# Patient Record
Sex: FEMALE
[De-identification: names, ages, dates, MRNs, and addresses within clinical notes are randomized per-mention and may not be internally consistent; named-entity substitution may affect disease eponyms.]

---

## 2021-11-22 ENCOUNTER — NON-APPOINTMENT (OUTPATIENT)
Age: 31
End: 2021-11-22

## 2021-11-23 ENCOUNTER — APPOINTMENT (OUTPATIENT)
Dept: OBGYN | Facility: CLINIC | Age: 31
End: 2021-11-23
Payer: COMMERCIAL

## 2021-11-23 DIAGNOSIS — Z87.19 PERSONAL HISTORY OF OTHER DISEASES OF THE DIGESTIVE SYSTEM: ICD-10-CM

## 2021-11-23 DIAGNOSIS — Z00.00 ENCOUNTER FOR GENERAL ADULT MEDICAL EXAMINATION W/OUT ABNORMAL FINDINGS: ICD-10-CM

## 2021-11-23 DIAGNOSIS — N39.3 STRESS INCONTINENCE (FEMALE) (MALE): ICD-10-CM

## 2021-11-23 DIAGNOSIS — R73.03 PREDIABETES.: ICD-10-CM

## 2021-11-23 DIAGNOSIS — Z83.49 FAMILY HISTORY OF OTHER ENDOCRINE, NUTRITIONAL AND METABOLIC DISEASES: ICD-10-CM

## 2021-11-23 LAB
HCG UR QL: NEGATIVE
QUALITY CONTROL: YES

## 2021-11-23 PROCEDURE — 81025 URINE PREGNANCY TEST: CPT

## 2021-11-23 PROCEDURE — 99385 PREV VISIT NEW AGE 18-39: CPT

## 2021-11-23 RX ORDER — BACILLUS COAGULANS/INULIN 1B-250 MG
CAPSULE ORAL
Refills: 0 | Status: ACTIVE | COMMUNITY

## 2021-11-23 RX ORDER — MULTIVITAMIN
TABLET ORAL
Refills: 0 | Status: ACTIVE | COMMUNITY

## 2021-11-23 NOTE — DISCUSSION/SUMMARY
[FreeTextEntry1] : 30yo G0 LMP 11/11 using condoms for BCM (declined h'monal bcm today) with ? abnl vag d/c, urinary frequency and BRIAN sxs. \par \par Urinary sxs: \par - UCx sent today \par - Will refer to Dr Anderson for further eval \par \par Ov cyst and pelvic discomfort on exam:\par - TVUS and MD visit \par \par Vag d/c: \par - Pap/GC/CT \par - BD affirm \par - Vulvovag hygeine reviewed \par \par RHM: \par - DVS neg x 3\par - Dentist, PCP, Derm as discussed \par - Offered HPV vacc today. Pt accepts/ Shot # 1 given today \par - Offered STI screen today. Pt accepts \par - Encouraged healthy diet, exercise, weight maint. \par \par Danyelle Centeno MD \par Obstetrician/Gynecologist\par \par

## 2021-11-23 NOTE — REASON FOR VISIT
[Initial] : an initial consultation for [Interpreters_IDNumber] : [id number] [Interpreters_FullName] : Tim  [TWNoteComboBox1] : Indonesian

## 2021-11-23 NOTE — PHYSICAL EXAM
[Appropriately responsive] : appropriately responsive [Alert] : alert [No Acute Distress] : no acute distress [No Lymphadenopathy] : no lymphadenopathy [Regular Rate Rhythm] : regular rate rhythm [No Murmurs] : no murmurs [Clear to Auscultation B/L] : clear to auscultation bilaterally [Soft] : soft [Non-tender] : non-tender [Non-distended] : non-distended [No HSM] : No HSM [No Lesions] : no lesions [No Mass] : no mass [Oriented x3] : oriented x3 [Examination Of The Breasts] : a normal appearance [No Masses] : no breast masses were palpable [Labia Majora] : normal [Labia Minora] : normal [Normal] : normal [Uterine Adnexae] : normal [Adnexa Tenderness On The Right] : tender

## 2021-11-23 NOTE — HISTORY OF PRESENT ILLNESS
[Patient reported breast sonogram was normal] : Patient reported breast sonogram was normal [Patient reported PAP Smear was normal] : Patient reported PAP Smear was normal [FreeTextEntry1] : 32yo G0 LMP 11/11 using condoms for BCM here for annual and with several complaints. She reports vaginal irritation since the summer, frequent burning with urination and yellowish d/c. Pt also has BRIAN issues. s/p COVID vaccine. \par \par Additionally, she says she was told she had cysts when she had [place]  [Mammogramdate] : N/A [BreastSonogramDate] : 2020  [PapSmeardate] : 2020  [TextBox_31] : [facility]  [BoneDensityDate] : N/A [ColonoscopyDate] : N/A

## 2021-11-24 LAB
C TRACH RRNA SPEC QL NAA+PROBE: NOT DETECTED
HIV1+2 AB SPEC QL IA.RAPID: NONREACTIVE
HPV HIGH+LOW RISK DNA PNL CVX: NOT DETECTED
N GONORRHOEA RRNA SPEC QL NAA+PROBE: NOT DETECTED
SOURCE TP AMPLIFICATION: NORMAL

## 2021-11-29 LAB
BACTERIA UR CULT: NORMAL
CANDIDA VAG CYTO: NOT DETECTED
G VAGINALIS+PREV SP MTYP VAG QL MICRO: NOT DETECTED
HBV CORE IGM SER QL: NONREACTIVE
HBV SURFACE AG SER QL: NONREACTIVE
HCV AB SER QL: NONREACTIVE
HCV S/CO RATIO: 0.08 S/CO
HSV 1+2 IGG SER IA-IMP: NEGATIVE
HSV 1+2 IGG SER IA-IMP: POSITIVE
HSV1 IGG SER QL: 31.2 INDEX
HSV2 IGG SER QL: 0.09 INDEX
T PALLIDUM AB SER QL IA: NEGATIVE
T VAGINALIS VAG QL WET PREP: NOT DETECTED

## 2021-11-30 DIAGNOSIS — N83.209 UNSPECIFIED OVARIAN CYST, UNSPECIFIED SIDE: ICD-10-CM

## 2021-12-03 LAB
CYTOLOGY CVX/VAG DOC THIN PREP: NORMAL
HSV1 IGM SER QL: NEGATIVE
HSV2 AB FLD-ACNC: NEGATIVE

## 2021-12-15 ENCOUNTER — ASOB RESULT (OUTPATIENT)
Age: 31
End: 2021-12-15

## 2021-12-15 ENCOUNTER — APPOINTMENT (OUTPATIENT)
Dept: ANTEPARTUM | Facility: CLINIC | Age: 31
End: 2021-12-15
Payer: COMMERCIAL

## 2021-12-15 ENCOUNTER — LABORATORY RESULT (OUTPATIENT)
Age: 31
End: 2021-12-15

## 2021-12-15 PROCEDURE — 76856 US EXAM PELVIC COMPLETE: CPT | Mod: 59

## 2021-12-15 PROCEDURE — 76830 TRANSVAGINAL US NON-OB: CPT

## 2021-12-16 ENCOUNTER — APPOINTMENT (OUTPATIENT)
Dept: OBGYN | Facility: CLINIC | Age: 31
End: 2021-12-16
Payer: COMMERCIAL

## 2021-12-16 DIAGNOSIS — N76.0 ACUTE VAGINITIS: ICD-10-CM

## 2021-12-16 PROCEDURE — 99214 OFFICE O/P EST MOD 30 MIN: CPT

## 2021-12-17 ENCOUNTER — NON-APPOINTMENT (OUTPATIENT)
Age: 31
End: 2021-12-17

## 2021-12-17 LAB — HBV SURFACE AB SER QL: REACTIVE

## 2021-12-20 ENCOUNTER — NON-APPOINTMENT (OUTPATIENT)
Age: 31
End: 2021-12-20

## 2022-02-16 ENCOUNTER — APPOINTMENT (OUTPATIENT)
Dept: OBGYN | Facility: CLINIC | Age: 32
End: 2022-02-16
Payer: COMMERCIAL

## 2022-02-16 VITALS
BODY MASS INDEX: 23.04 KG/M2 | SYSTOLIC BLOOD PRESSURE: 100 MMHG | WEIGHT: 130 LBS | HEIGHT: 63 IN | DIASTOLIC BLOOD PRESSURE: 60 MMHG

## 2022-02-16 DIAGNOSIS — Z32.02 ENCOUNTER FOR PREGNANCY TEST, RESULT NEGATIVE: ICD-10-CM

## 2022-02-16 DIAGNOSIS — Z23 ENCOUNTER FOR IMMUNIZATION: ICD-10-CM

## 2022-02-16 LAB
HCG UR QL: NEGATIVE
QUALITY CONTROL: YES

## 2022-02-16 PROCEDURE — 90471 IMMUNIZATION ADMIN: CPT

## 2022-02-16 PROCEDURE — 90649 4VHPV VACCINE 3 DOSE IM: CPT

## 2022-02-16 RX ORDER — TERCONAZOLE 80 MG/1
80 SUPPOSITORY VAGINAL
Qty: 5 | Refills: 1 | Status: DISCONTINUED | COMMUNITY
Start: 2021-12-16 | End: 2022-02-16

## 2022-02-16 NOTE — PLAN
[FreeTextEntry1] :  offered, pt declines\par Consent obtained\par Gardasil given L delt\par Lot [id number]\par exp 5/28/22\par ndc [id number]\par \par Joann Buckner CM

## 2022-06-14 ENCOUNTER — APPOINTMENT (OUTPATIENT)
Dept: OBGYN | Facility: CLINIC | Age: 32
End: 2022-06-14
Payer: COMMERCIAL

## 2022-06-14 VITALS
DIASTOLIC BLOOD PRESSURE: 60 MMHG | WEIGHT: 128.19 LBS | HEIGHT: 63 IN | BODY MASS INDEX: 22.71 KG/M2 | SYSTOLIC BLOOD PRESSURE: 96 MMHG

## 2022-06-14 DIAGNOSIS — N32.81 OVERACTIVE BLADDER: ICD-10-CM

## 2022-06-14 PROCEDURE — 99213 OFFICE O/P EST LOW 20 MIN: CPT

## 2022-06-14 NOTE — PHYSICAL EXAM
[Chaperone Present] : A chaperone was present in the examining room during all aspects of the physical examination [Appropriately responsive] : appropriately responsive [Alert] : alert [No Acute Distress] : no acute distress [Oriented x3] : oriented x3 [Examination Of The Breasts] : a normal appearance [Normal] : normal [No Masses] : no breast masses were palpable

## 2022-06-14 NOTE — HISTORY OF PRESENT ILLNESS
[N] : Patient denies prior pregnancies [Menarche Age: ____] : age at menarche was [unfilled] [Regular Cycle Intervals] : periods have been regular [Frequency: Q ___ days] : menstrual periods occur approximately every [unfilled] days [PapSmeardate] : 11/21 [PGHxTotal] : 0 [PGHxFullTerm] : 0 [PGHxPremature] : 0 [PGHxAbortions] : 0 [[facility]] : 0 [PGHxABInduced] : 0 [PGHxABSpont] : 0 [PGHxEctopic] : 0 [PGHxMultBirths] : 0

## 2022-06-24 ENCOUNTER — RESULT REVIEW (OUTPATIENT)
Age: 32
End: 2022-06-24

## 2022-06-24 ENCOUNTER — OUTPATIENT (OUTPATIENT)
Dept: OUTPATIENT SERVICES | Facility: HOSPITAL | Age: 32
LOS: 1 days | End: 2022-06-24
Payer: COMMERCIAL

## 2022-06-24 ENCOUNTER — APPOINTMENT (OUTPATIENT)
Dept: MAMMOGRAPHY | Facility: CLINIC | Age: 32
End: 2022-06-24

## 2022-06-24 ENCOUNTER — APPOINTMENT (OUTPATIENT)
Dept: ULTRASOUND IMAGING | Facility: CLINIC | Age: 32
End: 2022-06-24

## 2022-06-24 DIAGNOSIS — N64.4 MASTODYNIA: ICD-10-CM

## 2022-06-24 PROCEDURE — 76641 ULTRASOUND BREAST COMPLETE: CPT

## 2022-06-24 PROCEDURE — 76641 ULTRASOUND BREAST COMPLETE: CPT | Mod: 26,50

## 2022-06-28 ENCOUNTER — APPOINTMENT (OUTPATIENT)
Dept: OBGYN | Facility: CLINIC | Age: 32
End: 2022-06-28
Payer: COMMERCIAL

## 2022-06-28 VITALS
BODY MASS INDEX: 22.68 KG/M2 | HEIGHT: 63 IN | WEIGHT: 128 LBS | DIASTOLIC BLOOD PRESSURE: 60 MMHG | SYSTOLIC BLOOD PRESSURE: 100 MMHG

## 2022-06-28 PROCEDURE — 99213 OFFICE O/P EST LOW 20 MIN: CPT

## 2022-06-28 NOTE — REASON FOR VISIT
[Follow-Up] : a follow-up evaluation of [FreeTextEntry2] : right breast pain.  The patient states that the breast pain has improved.  She presents for results of a breast sonogram.

## 2022-06-30 ENCOUNTER — APPOINTMENT (OUTPATIENT)
Dept: OBGYN | Facility: CLINIC | Age: 32
End: 2022-06-30

## 2022-06-30 VITALS
WEIGHT: 128 LBS | DIASTOLIC BLOOD PRESSURE: 60 MMHG | HEIGHT: 63 IN | SYSTOLIC BLOOD PRESSURE: 100 MMHG | BODY MASS INDEX: 22.68 KG/M2

## 2022-06-30 DIAGNOSIS — N64.4 MASTODYNIA: ICD-10-CM

## 2022-06-30 DIAGNOSIS — Z23 ENCOUNTER FOR IMMUNIZATION: ICD-10-CM

## 2022-06-30 PROCEDURE — 90471 IMMUNIZATION ADMIN: CPT

## 2022-06-30 PROCEDURE — 90649 4VHPV VACCINE 3 DOSE IM: CPT

## 2022-06-30 RX ORDER — ERGOCALCIFEROL 1.25 MG/1
1.25 MG CAPSULE, LIQUID FILLED ORAL
Qty: 24 | Refills: 0 | Status: ACTIVE | COMMUNITY
Start: 2022-05-07

## 2022-06-30 RX ORDER — SOLIFENACIN SUCCINATE 5 MG/1
5 TABLET ORAL
Qty: 30 | Refills: 1 | Status: DISCONTINUED | COMMUNITY
Start: 2021-12-20 | End: 2022-06-30

## 2022-06-30 NOTE — PLAN
[FreeTextEntry1] : HPV 9 valent Vaccine   lot [id number]\par 15 AUG 2023\par \par administered to left deltoid without difficulty\par \par \par Patient to follow up ~ 2 months if breast discomfort continues   Return sooner if she notices any growth or erythema to right breast/nipple, etc

## 2022-06-30 NOTE — PHYSICAL EXAM
[Appropriately responsive] : appropriately responsive [Alert] : alert [No Acute Distress] : no acute distress [FreeTextEntry6] : no dominant mass   no erythema   no nipple discharge on right side.  left breast not evaluated [Normal] : normal [No Discharge] : no discharge [No Masses] : no breast masses were palpable

## 2025-04-08 ENCOUNTER — APPOINTMENT (OUTPATIENT)
Dept: RHEUMATOLOGY | Facility: CLINIC | Age: 35
End: 2025-04-08
Payer: COMMERCIAL

## 2025-04-08 ENCOUNTER — LABORATORY RESULT (OUTPATIENT)
Age: 35
End: 2025-04-08

## 2025-04-08 VITALS
TEMPERATURE: 97.6 F | HEIGHT: 63 IN | OXYGEN SATURATION: 98 % | WEIGHT: 132 LBS | SYSTOLIC BLOOD PRESSURE: 103 MMHG | BODY MASS INDEX: 23.39 KG/M2 | HEART RATE: 72 BPM | DIASTOLIC BLOOD PRESSURE: 62 MMHG

## 2025-04-08 DIAGNOSIS — M25.561 PAIN IN RIGHT KNEE: ICD-10-CM

## 2025-04-08 DIAGNOSIS — M25.562 PAIN IN RIGHT KNEE: ICD-10-CM

## 2025-04-08 DIAGNOSIS — G89.29 PAIN IN RIGHT KNEE: ICD-10-CM

## 2025-04-08 DIAGNOSIS — Z86.39 PERSONAL HISTORY OF OTHER ENDOCRINE, NUTRITIONAL AND METABOLIC DISEASE: ICD-10-CM

## 2025-04-08 DIAGNOSIS — M70.52 OTHER BURSITIS OF KNEE, RIGHT KNEE: ICD-10-CM

## 2025-04-08 DIAGNOSIS — M70.51 OTHER BURSITIS OF KNEE, RIGHT KNEE: ICD-10-CM

## 2025-04-08 PROCEDURE — 99204 OFFICE O/P NEW MOD 45 MIN: CPT

## 2025-04-09 LAB
C3 SERPL-MCNC: 109 MG/DL
C4 SERPL-MCNC: 24 MG/DL
CCP AB SER IA-ACNC: <8 U/ML
CRP SERPL-MCNC: 4 MG/L
FERRITIN SERPL-MCNC: 75 NG/ML
RF+CCP IGG SER-IMP: NEGATIVE
RHEUMATOID FACT SER QL: <10 IU/ML
THYROGLOB AB SERPL-ACNC: 17.5 IU/ML
THYROPEROXIDASE AB SERPL IA-ACNC: 16.3 IU/ML

## 2025-04-10 LAB
ACE BLD-CCNC: 51 U/L
DSDNA AB SER-ACNC: <1 IU/ML
ENA RNP AB SER IA-ACNC: <0.2 AL
ENA SM AB SER IA-ACNC: <0.2 AL
ENA SS-A AB SER IA-ACNC: <0.2 AL
ENA SS-B AB SER IA-ACNC: <0.2 AL
TTG IGA SER IA-ACNC: <0.5 U/ML
TTG IGA SER-ACNC: NEGATIVE

## 2025-05-02 ENCOUNTER — NON-APPOINTMENT (OUTPATIENT)
Age: 35
End: 2025-05-02